# Patient Record
Sex: FEMALE | Race: WHITE
[De-identification: names, ages, dates, MRNs, and addresses within clinical notes are randomized per-mention and may not be internally consistent; named-entity substitution may affect disease eponyms.]

---

## 2023-11-16 ENCOUNTER — OFFICE VISIT (OUTPATIENT)
Dept: INTERNAL MEDICINE CLINIC | Facility: CLINIC | Age: 28
End: 2023-11-16

## 2023-11-16 VITALS
HEIGHT: 65 IN | WEIGHT: 255 LBS | TEMPERATURE: 98.8 F | HEART RATE: 93 BPM | DIASTOLIC BLOOD PRESSURE: 76 MMHG | SYSTOLIC BLOOD PRESSURE: 112 MMHG | OXYGEN SATURATION: 96 % | BODY MASS INDEX: 42.49 KG/M2

## 2023-11-16 DIAGNOSIS — Z91.030 BEE ALLERGY STATUS: ICD-10-CM

## 2023-11-16 DIAGNOSIS — F32.A ANXIETY AND DEPRESSION: ICD-10-CM

## 2023-11-16 DIAGNOSIS — N92.6 MENSTRUAL CYCLE PROBLEM: ICD-10-CM

## 2023-11-16 DIAGNOSIS — R73.01 IFG (IMPAIRED FASTING GLUCOSE): ICD-10-CM

## 2023-11-16 DIAGNOSIS — J45.30 MILD PERSISTENT ASTHMA, UNCOMPLICATED: ICD-10-CM

## 2023-11-16 DIAGNOSIS — Z00.00 ROUTINE ADULT HEALTH MAINTENANCE: Primary | ICD-10-CM

## 2023-11-16 DIAGNOSIS — F41.9 ANXIETY AND DEPRESSION: ICD-10-CM

## 2023-11-16 PROBLEM — G93.2 IIH (IDIOPATHIC INTRACRANIAL HYPERTENSION): Status: ACTIVE | Noted: 2018-04-15

## 2023-11-16 PROBLEM — K76.0 NON-ALCOHOLIC FATTY LIVER DISEASE: Status: ACTIVE | Noted: 2021-09-10

## 2023-11-16 LAB
HCG, PREGNANCY, URINE, POC: NEGATIVE
VALID INTERNAL CONTROL, POC: YES

## 2023-11-16 RX ORDER — EPINEPHRINE 0.3 MG/.3ML
0.3 INJECTION SUBCUTANEOUS
COMMUNITY
Start: 2017-10-20

## 2023-11-16 RX ORDER — ALBUTEROL SULFATE 90 UG/1
2 AEROSOL, METERED RESPIRATORY (INHALATION) EVERY 6 HOURS PRN
Qty: 18 G | Refills: 11 | Status: SHIPPED | OUTPATIENT
Start: 2023-11-16

## 2023-11-16 RX ORDER — MELOXICAM 15 MG/1
15 TABLET ORAL DAILY
COMMUNITY
Start: 2023-08-29

## 2023-11-16 RX ORDER — TOPIRAMATE 50 MG/1
50 TABLET, FILM COATED ORAL
COMMUNITY
Start: 2023-05-30

## 2023-11-16 RX ORDER — ALBUTEROL SULFATE 90 UG/1
AEROSOL, METERED RESPIRATORY (INHALATION)
COMMUNITY
Start: 2023-10-16 | End: 2023-11-16 | Stop reason: SDUPTHER

## 2023-11-16 RX ORDER — CETIRIZINE HYDROCHLORIDE 10 MG/1
10 TABLET ORAL DAILY
COMMUNITY
Start: 2019-08-17

## 2023-11-16 RX ORDER — PANTOPRAZOLE SODIUM 40 MG/1
40 TABLET, DELAYED RELEASE ORAL DAILY
Qty: 30 TABLET | Refills: 11 | COMMUNITY
Start: 2023-05-30 | End: 2024-05-29

## 2023-11-16 RX ORDER — FLUTICASONE PROPIONATE AND SALMETEROL 250; 50 UG/1; UG/1
1 POWDER RESPIRATORY (INHALATION) EVERY 12 HOURS
Qty: 60 EACH | Refills: 11 | Status: SHIPPED | OUTPATIENT
Start: 2023-11-16

## 2023-11-16 RX ORDER — IPRATROPIUM BROMIDE AND ALBUTEROL SULFATE 2.5; .5 MG/3ML; MG/3ML
3 SOLUTION RESPIRATORY (INHALATION) EVERY 6 HOURS PRN
COMMUNITY
Start: 2021-08-23

## 2023-11-16 RX ORDER — EPINEPHRINE 0.3 MG/.3ML
0.3 INJECTION SUBCUTANEOUS DAILY PRN
Qty: 0.3 ML | Refills: 5 | Status: SHIPPED | OUTPATIENT
Start: 2023-11-16

## 2023-11-16 RX ORDER — FLUTICASONE PROPIONATE AND SALMETEROL 250; 50 UG/1; UG/1
1 POWDER RESPIRATORY (INHALATION) EVERY 12 HOURS
COMMUNITY
End: 2023-11-16 | Stop reason: SDUPTHER

## 2023-11-16 SDOH — ECONOMIC STABILITY: FOOD INSECURITY: WITHIN THE PAST 12 MONTHS, THE FOOD YOU BOUGHT JUST DIDN'T LAST AND YOU DIDN'T HAVE MONEY TO GET MORE.: NEVER TRUE

## 2023-11-16 SDOH — ECONOMIC STABILITY: HOUSING INSECURITY
IN THE LAST 12 MONTHS, WAS THERE A TIME WHEN YOU DID NOT HAVE A STEADY PLACE TO SLEEP OR SLEPT IN A SHELTER (INCLUDING NOW)?: NO

## 2023-11-16 SDOH — ECONOMIC STABILITY: INCOME INSECURITY: HOW HARD IS IT FOR YOU TO PAY FOR THE VERY BASICS LIKE FOOD, HOUSING, MEDICAL CARE, AND HEATING?: NOT HARD AT ALL

## 2023-11-16 SDOH — ECONOMIC STABILITY: FOOD INSECURITY: WITHIN THE PAST 12 MONTHS, YOU WORRIED THAT YOUR FOOD WOULD RUN OUT BEFORE YOU GOT MONEY TO BUY MORE.: NEVER TRUE

## 2023-11-16 ASSESSMENT — PATIENT HEALTH QUESTIONNAIRE - PHQ9
5. POOR APPETITE OR OVEREATING: 3
7. TROUBLE CONCENTRATING ON THINGS, SUCH AS READING THE NEWSPAPER OR WATCHING TELEVISION: 1
6. FEELING BAD ABOUT YOURSELF - OR THAT YOU ARE A FAILURE OR HAVE LET YOURSELF OR YOUR FAMILY DOWN: 0
9. THOUGHTS THAT YOU WOULD BE BETTER OFF DEAD, OR OF HURTING YOURSELF: 0
SUM OF ALL RESPONSES TO PHQ QUESTIONS 1-9: 11
1. LITTLE INTEREST OR PLEASURE IN DOING THINGS: 0
10. IF YOU CHECKED OFF ANY PROBLEMS, HOW DIFFICULT HAVE THESE PROBLEMS MADE IT FOR YOU TO DO YOUR WORK, TAKE CARE OF THINGS AT HOME, OR GET ALONG WITH OTHER PEOPLE: 2
3. TROUBLE FALLING OR STAYING ASLEEP: 3
4. FEELING TIRED OR HAVING LITTLE ENERGY: 1
SUM OF ALL RESPONSES TO PHQ QUESTIONS 1-9: 11
2. FEELING DOWN, DEPRESSED OR HOPELESS: 0
8. MOVING OR SPEAKING SO SLOWLY THAT OTHER PEOPLE COULD HAVE NOTICED. OR THE OPPOSITE, BEING SO FIGETY OR RESTLESS THAT YOU HAVE BEEN MOVING AROUND A LOT MORE THAN USUAL: 3
SUM OF ALL RESPONSES TO PHQ9 QUESTIONS 1 & 2: 0

## 2023-11-16 ASSESSMENT — COLUMBIA-SUICIDE SEVERITY RATING SCALE - C-SSRS
3. HAVE YOU BEEN THINKING ABOUT HOW YOU MIGHT KILL YOURSELF?: NO
7. DID THIS OCCUR IN THE LAST THREE MONTHS: NO
5. HAVE YOU STARTED TO WORK OUT OR WORKED OUT THE DETAILS OF HOW TO KILL YOURSELF? DO YOU INTEND TO CARRY OUT THIS PLAN?: NO
4. HAVE YOU HAD THESE THOUGHTS AND HAD SOME INTENTION OF ACTING ON THEM?: NO

## 2023-11-16 ASSESSMENT — ENCOUNTER SYMPTOMS
NAUSEA: 1
CHEST TIGHTNESS: 0

## 2023-11-16 NOTE — PROGRESS NOTES
Conjunctiva/sclera: Conjunctivae normal.   Cardiovascular:      Rate and Rhythm: Normal rate and regular rhythm. Heart sounds: Normal heart sounds. Pulmonary:      Effort: Pulmonary effort is normal.      Breath sounds: Normal breath sounds. Abdominal:      General: Bowel sounds are normal.      Palpations: Abdomen is soft. Musculoskeletal:      Right lower leg: No edema. Left lower leg: No edema. Skin:     General: Skin is warm. Coloration: Skin is not jaundiced. Neurological:      General: No focal deficit present. Mental Status: She is alert. Mental status is at baseline. Psychiatric:         Mood and Affect: Mood normal.         Thought Content: Thought content normal.            Wen was seen today for new patient and medication refill. Diagnoses and all orders for this visit:    Routine adult health maintenance  -     [facility]; Future  -     Comprehensive Metabolic Panel; Future  -     Lipid Panel; Future  -     Hemoglobin A1C; Future  -     TSH; Future    Mild persistent asthma, uncomplicated  -     albuterol sulfate HFA (PROVENTIL;VENTOLIN;PROAIR) 108 (90 Base) MCG/ACT inhaler; Inhale 2 puffs into the lungs every 6 hours as needed for Wheezing    Menstrual cycle problem  Comments:  advised to get birth control ,refer gyn  Orders:  -     AMB POC URINE PREGNANCY TEST, VISUAL COLOR COMPARISON    Bee allergy status  -     [facility]    Anxiety and depression  -     CBC; Future  -     Comprehensive Metabolic Panel; Future  -     Lipid Panel; Future  -     Hemoglobin A1C; Future  -     TSH; Future    IFG (impaired fasting glucose)  -     CBC; Future  -     Comprehensive Metabolic Panel; Future  -     Lipid Panel; Future  -     Hemoglobin A1C; Future  -     TSH; Future    Other orders  -     sertraline (ZOLOFT) 50 MG tablet;  Take 1 tablet by mouth daily  -     fluticasone-salmeterol (ADVAIR) 250-50 MCG/ACT AEPB

## 2024-02-09 ENCOUNTER — NURSE ONLY (OUTPATIENT)
Dept: INTERNAL MEDICINE CLINIC | Facility: CLINIC | Age: 29
End: 2024-02-09

## 2024-02-09 DIAGNOSIS — Z00.00 ROUTINE ADULT HEALTH MAINTENANCE: ICD-10-CM

## 2024-02-09 DIAGNOSIS — F41.9 ANXIETY AND DEPRESSION: ICD-10-CM

## 2024-02-09 DIAGNOSIS — R73.01 IFG (IMPAIRED FASTING GLUCOSE): ICD-10-CM

## 2024-02-09 DIAGNOSIS — F32.A ANXIETY AND DEPRESSION: ICD-10-CM

## 2024-02-09 LAB
ERYTHROCYTE [DISTWIDTH] IN BLOOD BY AUTOMATED COUNT: 12.5 % (ref 11.9–14.6)
EST. AVERAGE GLUCOSE BLD GHB EST-MCNC: 131 MG/DL
HBA1C MFR BLD: 6.2 % (ref 4.8–5.6)
HCT VFR BLD AUTO: 42.7 % (ref 35.8–46.3)
HGB BLD-MCNC: 13.4 G/DL (ref 11.7–15.4)
MCH RBC QN AUTO: 27.8 PG (ref 26.1–32.9)
MCHC RBC AUTO-ENTMCNC: 31.4 G/DL (ref 31.4–35)
MCV RBC AUTO: 88.6 FL (ref 82–102)
NRBC # BLD: 0 K/UL (ref 0–0.2)
PLATELET # BLD AUTO: 359 K/UL (ref 150–450)
PMV BLD AUTO: 9.6 FL (ref 9.4–12.3)
RBC # BLD AUTO: 4.82 M/UL (ref 4.05–5.2)
WBC # BLD AUTO: 10.5 K/UL (ref 4.3–11.1)

## 2024-02-10 LAB
ALBUMIN SERPL-MCNC: 3.8 G/DL (ref 3.5–5)
ALBUMIN/GLOB SERPL: 1.1 (ref 0.4–1.6)
ALP SERPL-CCNC: 96 U/L (ref 50–136)
ALT SERPL-CCNC: 45 U/L (ref 12–65)
ANION GAP SERPL CALC-SCNC: 5 MMOL/L (ref 2–11)
AST SERPL-CCNC: 23 U/L (ref 15–37)
BILIRUB SERPL-MCNC: 0.4 MG/DL (ref 0.2–1.1)
BUN SERPL-MCNC: 9 MG/DL (ref 6–23)
CALCIUM SERPL-MCNC: 8.9 MG/DL (ref 8.3–10.4)
CHLORIDE SERPL-SCNC: 104 MMOL/L (ref 103–113)
CHOLEST SERPL-MCNC: 205 MG/DL
CO2 SERPL-SCNC: 31 MMOL/L (ref 21–32)
CREAT SERPL-MCNC: 0.8 MG/DL (ref 0.6–1)
GLOBULIN SER CALC-MCNC: 3.5 G/DL (ref 2.8–4.5)
GLUCOSE SERPL-MCNC: 157 MG/DL (ref 65–100)
HDLC SERPL-MCNC: 51 MG/DL (ref 40–60)
HDLC SERPL: 4
LDLC SERPL CALC-MCNC: 138.2 MG/DL
POTASSIUM SERPL-SCNC: 3.8 MMOL/L (ref 3.5–5.1)
PROT SERPL-MCNC: 7.3 G/DL (ref 6.3–8.2)
SODIUM SERPL-SCNC: 140 MMOL/L (ref 136–146)
TRIGL SERPL-MCNC: 79 MG/DL (ref 35–150)
TSH, 3RD GENERATION: 3.06 UIU/ML (ref 0.36–3.74)
VLDLC SERPL CALC-MCNC: 15.8 MG/DL (ref 6–23)

## 2024-02-16 ENCOUNTER — OFFICE VISIT (OUTPATIENT)
Dept: INTERNAL MEDICINE CLINIC | Facility: CLINIC | Age: 29
End: 2024-02-16

## 2024-02-16 VITALS
HEART RATE: 82 BPM | WEIGHT: 270 LBS | TEMPERATURE: 98.4 F | OXYGEN SATURATION: 97 % | SYSTOLIC BLOOD PRESSURE: 110 MMHG | DIASTOLIC BLOOD PRESSURE: 80 MMHG | BODY MASS INDEX: 44.98 KG/M2 | HEIGHT: 65 IN

## 2024-02-16 DIAGNOSIS — R73.01 IFG (IMPAIRED FASTING GLUCOSE): ICD-10-CM

## 2024-02-16 DIAGNOSIS — K21.9 GASTROESOPHAGEAL REFLUX DISEASE WITHOUT ESOPHAGITIS: ICD-10-CM

## 2024-02-16 DIAGNOSIS — J45.30 MILD PERSISTENT ASTHMA, UNCOMPLICATED: ICD-10-CM

## 2024-02-16 DIAGNOSIS — F41.9 ANXIETY AND DEPRESSION: Primary | ICD-10-CM

## 2024-02-16 DIAGNOSIS — K76.0 NON-ALCOHOLIC FATTY LIVER DISEASE: ICD-10-CM

## 2024-02-16 DIAGNOSIS — F32.A ANXIETY AND DEPRESSION: Primary | ICD-10-CM

## 2024-02-16 RX ORDER — CETIRIZINE HYDROCHLORIDE 10 MG/1
10 TABLET ORAL DAILY
Qty: 30 TABLET | Refills: 11 | Status: SHIPPED | OUTPATIENT
Start: 2024-02-16

## 2024-02-16 RX ORDER — PANTOPRAZOLE SODIUM 40 MG/1
40 TABLET, DELAYED RELEASE ORAL DAILY
Qty: 30 TABLET | Refills: 11 | Status: SHIPPED | OUTPATIENT
Start: 2024-02-16 | End: 2025-02-15

## 2024-02-16 ASSESSMENT — PATIENT HEALTH QUESTIONNAIRE - PHQ9
SUM OF ALL RESPONSES TO PHQ QUESTIONS 1-9: 0
SUM OF ALL RESPONSES TO PHQ9 QUESTIONS 1 & 2: 0
SUM OF ALL RESPONSES TO PHQ QUESTIONS 1-9: 0
1. LITTLE INTEREST OR PLEASURE IN DOING THINGS: 0
SUM OF ALL RESPONSES TO PHQ QUESTIONS 1-9: 0
SUM OF ALL RESPONSES TO PHQ QUESTIONS 1-9: 0
2. FEELING DOWN, DEPRESSED OR HOPELESS: 0

## 2024-02-16 NOTE — PROGRESS NOTES
Wen was seen today for follow-up.    Diagnoses and all orders for this visit:    Anxiety and depression  -     sertraline (ZOLOFT) 50 MG tablet; Take 1 tablet by mouth in the morning and at bedtime  -     Hemoglobin A1C; Future  -     CBC with Auto Differential; Future  -     Hepatic Function Panel; Future  -     Basic Metabolic Panel; Future    IFG (impaired fasting glucose)  -     Hemoglobin A1C; Future  -     CBC with Auto Differential; Future  -     Hepatic Function Panel; Future  -     Basic Metabolic Panel; Future    Mild persistent asthma, uncomplicated  -     cetirizine (ZYRTEC) 10 MG tablet; Take 1 tablet by mouth daily    Gastroesophageal reflux disease without esophagitis  -     pantoprazole (PROTONIX) 40 MG tablet; Take 1 tablet by mouth daily  -     Hemoglobin A1C; Future  -     CBC with Auto Differential; Future  -     Hepatic Function Panel; Future  -     Basic Metabolic Panel; Future    Non-alcoholic fatty liver disease  -     Hemoglobin A1C; Future  -     CBC with Auto Differential; Future  -     Hepatic Function Panel; Future  -     Basic Metabolic Panel; Future          Wen Soliman is a 28 y.o. female    Chief Complaint   Patient presents with    Follow-up     3 months      Sertraline helping somewhat   Feels like sertraline not as affective,   Stress with kids   Sleep not the best    Trying to exercise some      Nurse Only on 02/09/2024   Component Date Value Ref Range Status    TSH, 3RD GENERATION 02/09/2024 3.060  0.358 - 3.740 uIU/mL Final    Hemoglobin A1C 02/09/2024 6.2 (H)  4.8 - 5.6 % Final    Estimated Avg Glucose 02/09/2024 131  mg/dL Final    Comment: Reference Range  Normal: 4.8-5.6  Diabetic >=6.5%  Normal       <5.7%      Cholesterol, Total 02/09/2024 205 (H)  <200 MG/DL Final    Comment: Borderline High: 200-239 mg/dL  High: Greater than or equal to 240 mg/dL      Triglycerides 02/09/2024 79  35 - 150 MG/DL Final    Comment: Borderline High: 150-199 mg/dL, High: 200-499

## 2024-05-17 ENCOUNTER — TELEPHONE (OUTPATIENT)
Dept: INTERNAL MEDICINE CLINIC | Facility: CLINIC | Age: 29
End: 2024-05-17

## 2024-05-17 NOTE — TELEPHONE ENCOUNTER
Called patient about missed appointment on 5/17/24 and patient states that she forgot about her appointment and she rescheduled.

## 2024-05-29 ENCOUNTER — TELEPHONE (OUTPATIENT)
Dept: INTERNAL MEDICINE CLINIC | Facility: CLINIC | Age: 29
End: 2024-05-29

## 2024-07-30 ENCOUNTER — APPOINTMENT (OUTPATIENT)
Dept: GENERAL RADIOLOGY | Age: 29
End: 2024-07-30

## 2024-07-30 ENCOUNTER — HOSPITAL ENCOUNTER (EMERGENCY)
Age: 29
Discharge: HOME | End: 2024-07-30

## 2024-07-30 VITALS
RESPIRATION RATE: 17 BRPM | WEIGHT: 280 LBS | TEMPERATURE: 99 F | SYSTOLIC BLOOD PRESSURE: 141 MMHG | HEART RATE: 87 BPM | BODY MASS INDEX: 45 KG/M2 | HEIGHT: 66 IN | OXYGEN SATURATION: 95 % | DIASTOLIC BLOOD PRESSURE: 99 MMHG

## 2024-07-30 DIAGNOSIS — M25.561 ACUTE PAIN OF RIGHT KNEE: Primary | ICD-10-CM

## 2024-07-30 PROCEDURE — 73562 X-RAY EXAM OF KNEE 3: CPT

## 2024-07-30 PROCEDURE — 99283 EMERGENCY DEPT VISIT LOW MDM: CPT

## 2024-07-30 ASSESSMENT — PAIN DESCRIPTION - ORIENTATION
ORIENTATION: RIGHT
ORIENTATION: RIGHT

## 2024-07-30 ASSESSMENT — PAIN SCALES - GENERAL
PAINLEVEL_OUTOF10: 8
PAINLEVEL_OUTOF10: 6

## 2024-07-30 ASSESSMENT — PAIN DESCRIPTION - LOCATION
LOCATION: KNEE
LOCATION: KNEE

## 2024-07-30 ASSESSMENT — PAIN - FUNCTIONAL ASSESSMENT
PAIN_FUNCTIONAL_ASSESSMENT: 0-10
PAIN_FUNCTIONAL_ASSESSMENT: 0-10

## 2024-07-30 ASSESSMENT — LIFESTYLE VARIABLES
HOW OFTEN DO YOU HAVE A DRINK CONTAINING ALCOHOL: NEVER
HOW MANY STANDARD DRINKS CONTAINING ALCOHOL DO YOU HAVE ON A TYPICAL DAY: PATIENT DOES NOT DRINK

## 2024-07-30 NOTE — ED PROVIDER NOTES
Emergency Department Provider Note       PCP: Unknown, Provider, APRN - NP   Age: 29 y.o.   Sex: female     DISPOSITION Decision To Discharge 07/30/2024 04:07:17 AM       ICD-10-CM    1. Acute pain of right knee  M25.561 [facility]     [facility] ORTHOPEDIC SUPPLIES Crutches; Pair, Right Side Injury; Med (5'2\"-5'10\")          Medical Decision Making     29-year-old female presents with anterior right knee pain.  Imaging negative.  Discharged with follow-up to orthopedic.     1 or more acute illnesses that pose a threat to life or bodily function.   Over the counter drug management performed.  Prescription drug management performed.  Patient was discharged risks and benefits of hospitalization were considered.  Shared medical decision making was utilized in creating the patients health plan today.    I independently ordered and reviewed each unique test.  I reviewed external records: ED visit note from an outside group.  I reviewed external records: provider visit note from PCP.  I reviewed external records: provider visit note from outside specialist.     I interpreted the X-rays no fracture.              History     29-year-old female presents with right knee pain which has been ongoing for several days after falling in her yard.  She is able to bear weight with minimal pain.    The history is provided by the patient.       ROS     Review of Systems   Musculoskeletal:  Positive for arthralgias.   All other systems reviewed and are negative.       Physical Exam     Vitals signs and nursing note reviewed:  Vitals:    07/30/24 0222 07/30/24 0224 07/30/24 0420   BP: (!) 139/95  (!) 141/99   Pulse: (!) 107  87   Resp: 18  17   Temp: 99 °F (37.2 °C)     TempSrc: Oral     SpO2: 94%  95%   Weight:  127 kg (280 lb)    Height:  1.676 m (5' 6\")       Physical Exam  Vitals and nursing note reviewed.   Constitutional:       Appearance: Normal appearance.   Cardiovascular:      Rate and Rhythm:

## 2024-07-30 NOTE — ED NOTES
Patient mobility status  with no difficulty and crutches . Provider aware     I have reviewed discharge instructions with the patient.  The patient verbalized understanding.    Patient left ED via Discharge Method: crutches to Home with Extended Family:.    Opportunity for questions and clarification provided.     Patient given 0 scripts.            Allison Foster LPN  07/30/24 0422

## 2024-07-30 NOTE — ED TRIAGE NOTES
Patient arrives ambulatory to triage. Reports right knee pain x3 days. Reports tripping and falling on Saturday. States her knee \"locked up\". Denies hitting head, denies LOC, denies thinners.

## 2024-07-30 NOTE — DISCHARGE INSTRUCTIONS
Your knee x-ray shows no fracture.  Please follow-up with orthopedics as listed on this document for further evaluation of your knee pain.

## 2024-09-27 ENCOUNTER — HOSPITAL ENCOUNTER (EMERGENCY)
Age: 29
Discharge: HOME | End: 2024-09-28
Attending: EMERGENCY MEDICINE
Payer: COMMERCIAL

## 2024-09-27 VITALS
HEIGHT: 66 IN | BODY MASS INDEX: 40.18 KG/M2 | OXYGEN SATURATION: 97 % | HEART RATE: 103 BPM | RESPIRATION RATE: 19 BRPM | WEIGHT: 250 LBS | TEMPERATURE: 99.8 F | SYSTOLIC BLOOD PRESSURE: 145 MMHG | DIASTOLIC BLOOD PRESSURE: 91 MMHG

## 2024-09-27 DIAGNOSIS — J02.9 SORE THROAT: Primary | ICD-10-CM

## 2024-09-27 DIAGNOSIS — R06.00 DYSPNEA AND RESPIRATORY ABNORMALITIES: ICD-10-CM

## 2024-09-27 DIAGNOSIS — R06.89 DYSPNEA AND RESPIRATORY ABNORMALITIES: ICD-10-CM

## 2024-09-27 LAB — STREP, MOLECULAR: NOT DETECTED

## 2024-09-27 PROCEDURE — 87651 STREP A DNA AMP PROBE: CPT

## 2024-09-27 PROCEDURE — 6370000000 HC RX 637 (ALT 250 FOR IP): Performed by: EMERGENCY MEDICINE

## 2024-09-27 PROCEDURE — 99284 EMERGENCY DEPT VISIT MOD MDM: CPT

## 2024-09-27 PROCEDURE — 96372 THER/PROPH/DIAG INJ SC/IM: CPT

## 2024-09-27 PROCEDURE — 6360000002 HC RX W HCPCS: Performed by: EMERGENCY MEDICINE

## 2024-09-27 RX ORDER — FAMOTIDINE 20 MG/1
40 TABLET, FILM COATED ORAL ONCE
Status: COMPLETED | OUTPATIENT
Start: 2024-09-27 | End: 2024-09-27

## 2024-09-27 RX ORDER — FAMOTIDINE 20 MG/1
20 TABLET, FILM COATED ORAL 2 TIMES DAILY
Qty: 20 TABLET | Refills: 0 | Status: SHIPPED | OUTPATIENT
Start: 2024-09-27

## 2024-09-27 RX ORDER — METHYLPREDNISOLONE 4 MG
TABLET, DOSE PACK ORAL
Qty: 1 KIT | Refills: 0 | Status: SHIPPED | OUTPATIENT
Start: 2024-09-27

## 2024-09-27 RX ORDER — DEXAMETHASONE SODIUM PHOSPHATE 10 MG/ML
10 INJECTION INTRAMUSCULAR; INTRAVENOUS
Status: COMPLETED | OUTPATIENT
Start: 2024-09-27 | End: 2024-09-27

## 2024-09-27 RX ORDER — DIPHENHYDRAMINE HCL 25 MG
50 CAPSULE ORAL
Status: COMPLETED | OUTPATIENT
Start: 2024-09-27 | End: 2024-09-27

## 2024-09-27 RX ORDER — IPRATROPIUM BROMIDE AND ALBUTEROL SULFATE 2.5; .5 MG/3ML; MG/3ML
1 SOLUTION RESPIRATORY (INHALATION)
Status: COMPLETED | OUTPATIENT
Start: 2024-09-27 | End: 2024-09-27

## 2024-09-27 RX ADMIN — DEXAMETHASONE SODIUM PHOSPHATE 10 MG: 10 INJECTION INTRAMUSCULAR; INTRAVENOUS at 18:58

## 2024-09-27 RX ADMIN — FAMOTIDINE 40 MG: 20 TABLET, FILM COATED ORAL at 18:58

## 2024-09-27 RX ADMIN — IPRATROPIUM BROMIDE AND ALBUTEROL SULFATE 1 DOSE: 2.5; .5 SOLUTION RESPIRATORY (INHALATION) at 20:25

## 2024-09-27 RX ADMIN — DIPHENHYDRAMINE HYDROCHLORIDE 50 MG: 25 CAPSULE ORAL at 18:57

## 2024-09-27 ASSESSMENT — PAIN DESCRIPTION - ORIENTATION: ORIENTATION: MID;INNER

## 2024-09-27 ASSESSMENT — ENCOUNTER SYMPTOMS
SHORTNESS OF BREATH: 1
ABDOMINAL PAIN: 0
TROUBLE SWALLOWING: 1

## 2024-09-27 ASSESSMENT — PAIN DESCRIPTION - PAIN TYPE: TYPE: ACUTE PAIN

## 2024-09-27 ASSESSMENT — PAIN DESCRIPTION - DIRECTION: RADIATING_TOWARDS: NECK

## 2024-09-27 ASSESSMENT — PAIN DESCRIPTION - LOCATION: LOCATION: THROAT

## 2024-09-27 ASSESSMENT — PAIN DESCRIPTION - FREQUENCY: FREQUENCY: CONTINUOUS

## 2024-09-27 ASSESSMENT — PAIN - FUNCTIONAL ASSESSMENT
PAIN_FUNCTIONAL_ASSESSMENT: ACTIVITIES ARE NOT PREVENTED
PAIN_FUNCTIONAL_ASSESSMENT: 0-10

## 2024-09-27 ASSESSMENT — PAIN DESCRIPTION - DESCRIPTORS: DESCRIPTORS: SORE

## 2024-09-27 ASSESSMENT — PAIN DESCRIPTION - ONSET: ONSET: ON-GOING

## 2024-09-27 ASSESSMENT — PAIN SCALES - GENERAL: PAINLEVEL_OUTOF10: 4

## 2024-09-27 NOTE — ED PROVIDER NOTES
Emergency Department Provider Note       PCP: Unknown, Provider   Age: 29 y.o.   Sex: female     DISPOSITION Decision To Discharge 09/27/2024 08:57:30 PM  Condition at Disposition: Data Unavailable       ICD-10-CM    1. Sore throat  J02.9       2. Dyspnea and respiratory abnormalities  R06.00     R06.89           Medical Decision Making     29-year-old female patient presents with a 1 day history of throat irritation and soreness and feeling short of breath  States it began when she was cleaning a bathroom with a combination of bleach and [place]  Attempted a breathing treatment last night but felt worse this morning  Workup today revealed a negative strep  Feeling better with breathing treatment  Will discharge     1 acute illness with systemic symptoms.  Prescription drug management performed.  Patient was discharged risks and benefits of hospitalization were considered.  Shared medical decision making was utilized in creating the patients health plan today.    I independently ordered and reviewed each unique test.  I reviewed external records: ED visit note from an outside group.  I reviewed external records: provider visit note from PCP.  I reviewed external records: provider visit note from outside specialist.   The patients assessment required an independent historian: Family members at bedside.  The reason they were needed is important historical information not provided by the patient.                History     29-year-old female patient presents to the ER with complaints of throat tightness pain when she swallows mild shortness of breath  Patient states she was cleaning with bleach in her bathroom and she noticed that her throat became to get tight and irritated and had some cough  She has had no vomiting or diarrhea  Attempted Benadryl and a nebulizer treatment overnight but her symptoms persisted until today  Arrives here with a temperature of 99 8 mildly tachycardic      The history is provided by

## 2024-09-28 NOTE — DISCHARGE INSTRUCTIONS
Take medication as prescribed  Use your breathing treatments 3-4 times a day as needed    Avoid exposure to possible toxic fumes    Follow-up with your doctor Monday  We would love to help you get a primary care doctor for follow-up after your emergency department visit.    Please call [phone] between 7AM - 6PM Monday to Friday.  A care navigator will be able to assist you with setting up a doctor close to your home.         Return to ER for any worsening symptoms or new problems which may arise

## 2024-10-05 ENCOUNTER — APPOINTMENT (OUTPATIENT)
Dept: GENERAL RADIOLOGY | Age: 29
End: 2024-10-05
Payer: COMMERCIAL

## 2024-10-05 ENCOUNTER — HOSPITAL ENCOUNTER (EMERGENCY)
Age: 29
Discharge: HOME | End: 2024-10-05
Attending: EMERGENCY MEDICINE
Payer: COMMERCIAL

## 2024-10-05 VITALS
HEIGHT: 66 IN | BODY MASS INDEX: 40.18 KG/M2 | HEART RATE: 88 BPM | WEIGHT: 250 LBS | DIASTOLIC BLOOD PRESSURE: 91 MMHG | RESPIRATION RATE: 17 BRPM | OXYGEN SATURATION: 98 % | TEMPERATURE: 98.6 F | SYSTOLIC BLOOD PRESSURE: 135 MMHG

## 2024-10-05 DIAGNOSIS — J02.9 SORE THROAT: Primary | ICD-10-CM

## 2024-10-05 DIAGNOSIS — R13.10 DYSPHAGIA, UNSPECIFIED TYPE: ICD-10-CM

## 2024-10-05 LAB
ANION GAP SERPL CALC-SCNC: 11 MMOL/L (ref 9–18)
BASOPHILS # BLD: 0.1 K/UL (ref 0–0.2)
BASOPHILS NFR BLD: 1 % (ref 0–2)
BUN SERPL-MCNC: 5 MG/DL (ref 6–23)
CALCIUM SERPL-MCNC: 9 MG/DL (ref 8.8–10.2)
CHLORIDE SERPL-SCNC: 104 MMOL/L (ref 98–107)
CO2 SERPL-SCNC: 25 MMOL/L (ref 20–28)
CREAT SERPL-MCNC: 0.8 MG/DL (ref 0.6–1.1)
DIFFERENTIAL METHOD BLD: ABNORMAL
EOSINOPHIL # BLD: 0.3 K/UL (ref 0–0.8)
EOSINOPHIL NFR BLD: 4 % (ref 0.5–7.8)
ERYTHROCYTE [DISTWIDTH] IN BLOOD BY AUTOMATED COUNT: 12.6 % (ref 11.9–14.6)
GLUCOSE SERPL-MCNC: 132 MG/DL (ref 70–99)
HCT VFR BLD AUTO: 40.9 % (ref 35.8–46.3)
HETEROPH AB BLD QL IA: NEGATIVE
HGB BLD-MCNC: 13.5 G/DL (ref 11.7–15.4)
IMM GRANULOCYTES # BLD AUTO: 0.1 K/UL (ref 0–0.5)
IMM GRANULOCYTES NFR BLD AUTO: 1 % (ref 0–5)
LYMPHOCYTES # BLD: 2.5 K/UL (ref 0.5–4.6)
LYMPHOCYTES NFR BLD: 26 % (ref 13–44)
MCH RBC QN AUTO: 28.2 PG (ref 26.1–32.9)
MCHC RBC AUTO-ENTMCNC: 33 G/DL (ref 31.4–35)
MCV RBC AUTO: 85.6 FL (ref 82–102)
MONOCYTES # BLD: 0.6 K/UL (ref 0.1–1.3)
MONOCYTES NFR BLD: 6 % (ref 4–12)
NEUTS SEG # BLD: 6.3 K/UL (ref 1.7–8.2)
NEUTS SEG NFR BLD: 64 % (ref 43–78)
NRBC # BLD: 0 K/UL (ref 0–0.2)
PLATELET # BLD AUTO: 318 K/UL (ref 150–450)
PMV BLD AUTO: 9.2 FL (ref 9.4–12.3)
POTASSIUM SERPL-SCNC: 4 MMOL/L (ref 3.5–5.1)
RBC # BLD AUTO: 4.78 M/UL (ref 4.05–5.2)
SODIUM SERPL-SCNC: 140 MMOL/L (ref 136–145)
STREP, MOLECULAR: NOT DETECTED
WBC # BLD AUTO: 9.8 K/UL (ref 4.3–11.1)

## 2024-10-05 PROCEDURE — 85025 COMPLETE CBC W/AUTO DIFF WBC: CPT

## 2024-10-05 PROCEDURE — 87651 STREP A DNA AMP PROBE: CPT

## 2024-10-05 PROCEDURE — 70360 X-RAY EXAM OF NECK: CPT

## 2024-10-05 PROCEDURE — 6370000000 HC RX 637 (ALT 250 FOR IP): Performed by: EMERGENCY MEDICINE

## 2024-10-05 PROCEDURE — 86308 HETEROPHILE ANTIBODY SCREEN: CPT

## 2024-10-05 PROCEDURE — 80048 BASIC METABOLIC PNL TOTAL CA: CPT

## 2024-10-05 PROCEDURE — 99284 EMERGENCY DEPT VISIT MOD MDM: CPT

## 2024-10-05 RX ORDER — SUCRALFATE 1 G/1
1 TABLET ORAL 4 TIMES DAILY
Qty: 120 TABLET | Refills: 0 | Status: SHIPPED | OUTPATIENT
Start: 2024-10-05

## 2024-10-05 RX ORDER — SUCRALFATE 1 G/1
1 TABLET ORAL
Status: COMPLETED | OUTPATIENT
Start: 2024-10-05 | End: 2024-10-05

## 2024-10-05 RX ADMIN — SUCRALFATE 1 G: 1 TABLET ORAL at 22:54

## 2024-10-05 ASSESSMENT — PAIN SCALES - GENERAL: PAINLEVEL_OUTOF10: 2

## 2024-10-05 ASSESSMENT — LIFESTYLE VARIABLES
HOW MANY STANDARD DRINKS CONTAINING ALCOHOL DO YOU HAVE ON A TYPICAL DAY: 1 OR 2
HOW OFTEN DO YOU HAVE A DRINK CONTAINING ALCOHOL: MONTHLY OR LESS

## 2024-10-05 NOTE — ED TRIAGE NOTES
Patient arrived with a sore throat and difficulty swallowing food. Patient reports of food getting stuck and chokes on it.     Patient reports of having issues when she was younger- took her tonsils out.

## 2024-10-05 NOTE — ED PROVIDER NOTES
Physical Activity     Days of Exercise per Week: 0     Minutes of Exercise per Session: 0     Total Minutes of Exercise per Week: 0   Stress: Stress Concern Present (5/30/2023)    Received from [facility]    Stress     Feeling of Stress : To some extent   Social Connections: Unknown (5/31/2024)    Received from [facility]    Social Connections     Frequency of Communication with Friends and Family: Not asked     Frequency of Social Gatherings with Friends and Family: Not asked   Intimate Partner Violence: Unknown (5/31/2024)    Received from [facility]    Intimate Partner Violence     Fear of Current or Ex-Partner: Not asked     Emotionally Abused: Not asked     Physically Abused: Not asked     Sexually Abused: Not asked   Housing Stability: Unknown (11/16/2023)    Housing Stability Vital Sign     Unstable Housing in the Last Year: No        Previous Medications    ALBUTEROL SULFATE HFA (PROVENTIL;VENTOLIN;PROAIR) 108 (90 BASE) MCG/ACT INHALER    Inhale 2 puffs into the lungs every 6 hours as needed for Wheezing    CETIRIZINE (ZYRTEC) 10 MG TABLET    Take 1 tablet by mouth daily    EPINEPHRINE (EPIPEN 2-FARIBA) 0.3 MG/0.3ML SOAJ INJECTION    Inject 0.3 mLs into the muscle daily as needed (anyphylaxis) Use as directed for allergic reaction    EPINEPHRINE (EPIPEN) 0.3 MG/0.3ML SOAJ INJECTION    Inject 0.3 mLs into the muscle once as needed    FAMOTIDINE (PEPCID) 20 MG TABLET    Take 1 tablet by mouth 2 times daily    FLUTICASONE-SALMETEROL (ADVAIR) 250-50 MCG/ACT AEPB DISKUS INHALER    Inhale 1 puff into the lungs in the morning and 1 puff in the evening.    IPRATROPIUM 0.5 MG-ALBUTEROL 2.5 MG (DUONEB) 0.5-2.5 (3) MG/3ML SOLN NEBULIZER SOLUTION    Inhale 3 mLs into the lungs every 6 hours as needed    MELOXICAM (MOBIC) 15 MG TABLET    Take 1 tablet by mouth daily    METHYLPREDNISOLONE (MEDROL DOSEPACK) 4 MG TABLET    Take by mouth as directed on pack    PANTOPRAZOLE  (PROTONIX) 40 MG TABLET    Take 1 tablet by mouth daily    SERTRALINE (ZOLOFT) 50 MG TABLET    Take 1 tablet by mouth in the morning and at bedtime    TOPIRAMATE (TOPAMAX) 50 MG TABLET    Take 1 tablet by mouth        Results for orders placed or performed during the hospital encounter of 10/05/24   Group A Strep Screen By PCR    Specimen: Swab   Result Value Ref Range    Strep, Molecular Not detected NOTD     XR NECK SOFT TISSUE    Narrative    CLINICAL INDICATION: Dysphagia.    TECHNIQUE: 2 views of the soft tissue neck were performed.      Impression    Findings and impression:    Cervical vertebral bodies are in good anatomic alignment. Vertebral body heights  are maintained. The spaces are intact. There is no prevertebral soft tissue  swelling.    Electronically signed by Arnoldo Muñiz   CBC with Auto Differential   Result Value Ref Range    WBC 9.8 4.3 - 11.1 K/uL    RBC 4.78 4.05 - 5.2 M/uL    Hemoglobin 13.5 11.7 - 15.4 g/dL    Hematocrit 40.9 35.8 - 46.3 %    MCV 85.6 82 - 102 FL    MCH 28.2 26.1 - 32.9 PG    MCHC 33.0 31.4 - 35.0 g/dL    RDW 12.6 11.9 - 14.6 %    Platelets 318 150 - 450 K/uL    MPV 9.2 (L) 9.4 - 12.3 FL    nRBC 0.00 0.0 - 0.2 K/uL    Differential Type AUTOMATED      Neutrophils % 64 43 - 78 %    Lymphocytes % 26 13 - 44 %    Monocytes % 6 4.0 - 12.0 %    Eosinophils % 4 0.5 - 7.8 %    Basophils % 1 0.0 - 2.0 %    Immature Granulocytes % 1 0.0 - 5.0 %    Neutrophils Absolute 6.3 1.7 - 8.2 K/UL    Lymphocytes Absolute 2.5 0.5 - 4.6 K/UL    Monocytes Absolute 0.6 0.1 - 1.3 K/UL    Eosinophils Absolute 0.3 0.0 - 0.8 K/UL    Basophils Absolute 0.1 0.0 - 0.2 K/UL    Immature Granulocytes Absolute 0.1 0.0 - 0.5 K/UL   Basic Metabolic Panel   Result Value Ref Range    Sodium 140 136 - 145 mmol/L    Potassium 4.0 3.5 - 5.1 mmol/L    Chloride 104 98 - 107 mmol/L    CO2 25 20 - 28 mmol/L    Anion Gap 11 9 - 18 mmol/L    Glucose 132 (H) 70 - 99 mg/dL    BUN 5 (L) 6 - 23 MG/DL    Creatinine 0.80

## 2024-10-06 NOTE — DISCHARGE INSTRUCTIONS
I suspect irritation from stomach acid or other previous chemical exposure.  I suspect things will improve on their own but I want you to continue your acid medication.  Also use the Carafate.  If not improving in 3 to 4 days, call your primary care doctor recheck.  Also number for ear nose and throat doctor is given as well.  Recheck for drooling, vomiting or shortness of breath.

## 2024-10-06 NOTE — ED NOTES
I have reviewed discharge instructions with the patient.  The patient verbalized understanding.    Patient left ED via Discharge Method: ambulatory to Home with family.    Opportunity for questions and clarification provided.       Patient given 1 scripts.         To continue your aftercare when you leave the hospital, you may receive an automated call from our care team to check in on how you are doing.  This is a free service and part of our promise to provide the best care and service to meet your aftercare needs.” If you have questions, or wish to unsubscribe from this service please call [phone].  Thank you for Choosing our [facility] Emergency Department.        Lizzy Bañuelos LPN  10/05/24 3228

## 2024-10-10 ENCOUNTER — OFFICE VISIT (OUTPATIENT)
Dept: ENT CLINIC | Age: 29
End: 2024-10-10
Payer: COMMERCIAL

## 2024-10-10 VITALS
RESPIRATION RATE: 19 BRPM | DIASTOLIC BLOOD PRESSURE: 80 MMHG | SYSTOLIC BLOOD PRESSURE: 128 MMHG | WEIGHT: 250 LBS | HEIGHT: 66 IN | BODY MASS INDEX: 40.18 KG/M2

## 2024-10-10 DIAGNOSIS — R13.12 OROPHARYNGEAL DYSPHAGIA: Primary | Chronic | ICD-10-CM

## 2024-10-10 DIAGNOSIS — K21.9 LARYNGOPHARYNGEAL REFLUX (LPR): Chronic | ICD-10-CM

## 2024-10-10 DIAGNOSIS — K11.7 XEROSTOMIA: Chronic | ICD-10-CM

## 2024-10-10 PROCEDURE — 99203 OFFICE O/P NEW LOW 30 MIN: CPT | Performed by: PHYSICIAN ASSISTANT

## 2024-10-10 PROCEDURE — 31575 DIAGNOSTIC LARYNGOSCOPY: CPT | Performed by: PHYSICIAN ASSISTANT

## 2024-10-10 ASSESSMENT — ENCOUNTER SYMPTOMS
SHORTNESS OF BREATH: 0
TROUBLE SWALLOWING: 1
CHOKING: 0
FACIAL SWELLING: 0
SINUS PRESSURE: 0
EYE DISCHARGE: 0
SINUS PAIN: 0
DIARRHEA: 0
STRIDOR: 0
COUGH: 0
VOICE CHANGE: 1
SORE THROAT: 1
APNEA: 0
CONSTIPATION: 0
NAUSEA: 0
EYE ITCHING: 0
EYE PAIN: 0
WHEEZING: 0

## 2024-10-10 NOTE — PROGRESS NOTES
noted.      Procedure: Diagnostic Flexible Laryngoscopy     Findings:   Nose: normal turbinates and middle meatus bilaterally, very dry. Mild rightward septal deviation.     Nasopharynx: normal eustachian tube, delia, and fossa of Rosenmueler bilaterally    Hypopharynx: normal including the vallecula, piriform sinuses, base of tongue, and postcricoid area. Erythema and edema of the arytenoid cartilage.     Glottis and Laryngeal Motor exam:  No evidence of vocal fold weakness. normal abduction, adduction, and stretch. no mucosal lesion.     Subglottis and Trachea:  no visible lesions    Indication: Adequate visualization of the larynx with connected speech not possible without endoscopic evaluation.    Consent: The patient verbally consented to the recording of their face and upper aerodigestive tract.    Anesthesia: Phenylephrine, Lidocaine    Details: A flexible fiberoptic laryngoscope was passed through the most patent nasal cavity into the nasopharynx which was examined. The scope was then passed into the oropharynx. The hypopharynx, base of tongue, vallecula, epiglottis, aryepiglottic folds, arytenoids, false vocal cords, true vocal cords, subglottic area, pyriform sinuses, and the post cricoid area was examined.      Assessment/Plan:  1. Oropharyngeal dysphagia  -     LARYNGOSCOPY,FLEX FIBER,DIAGNOSTIC  -     FL MODIFIED BARIUM SWALLOW W VIDEO; Future  2. Xerostomia  3. Laryngopharyngeal reflux (LPR)      Pt has findings consistent with LPR, recommend restarting her Pepcid and Protonix, she had stopped a year ago. Recommend MBS and likely GI referral. Oral hydrate for dryness. I will call with results.       Return if symptoms worsen or fail to improve.    Patient agrees with this plan.        BERTA Eng    This note was generated using voice recognition software, please excuse any typos.

## 2024-10-24 ENCOUNTER — HOSPITAL ENCOUNTER (OUTPATIENT)
Dept: GENERAL RADIOLOGY | Age: 29
Discharge: HOME | End: 2024-10-27
Payer: COMMERCIAL

## 2024-10-24 DIAGNOSIS — R13.12 OROPHARYNGEAL DYSPHAGIA: Chronic | ICD-10-CM

## 2024-10-24 DIAGNOSIS — R13.12 OROPHARYNGEAL DYSPHAGIA: Primary | ICD-10-CM

## 2024-10-24 DIAGNOSIS — K21.9 LARYNGOPHARYNGEAL REFLUX (LPR): ICD-10-CM

## 2024-10-24 PROCEDURE — 92611 MOTION FLUOROSCOPY/SWALLOW: CPT

## 2024-10-24 PROCEDURE — 2500000003 HC RX 250 WO HCPCS: Performed by: PHYSICIAN ASSISTANT

## 2024-10-24 PROCEDURE — 74230 X-RAY XM SWLNG FUNCJ C+: CPT

## 2024-10-24 RX ADMIN — BARIUM SULFATE 30 ML: 400 PASTE ORAL at 09:34

## 2024-10-24 RX ADMIN — BARIUM SULFATE 30 ML: 0.81 POWDER, FOR SUSPENSION ORAL at 09:34

## 2024-10-24 NOTE — THERAPY EVALUATION
Wen Soliman  : 1995  Primary: [place]  Secondary:   MRN: [id number] [facility] RADIOLOGY  [address] [place] [address]  Phone: [phone]    Visit Info:    Date 10/24/2024   SPEECH LANGUAGE PATHOLOGY:   MODIFIED BARIUM SWALLOW STUDY     Appt Desk   Episode      Treatment Diagnosis:    Oropharyngeal dysphagia    Medical/Referring Diagnosis:  Oropharyngeal dysphagia [R13.12]  Referring Physician:  Trupti Britton PA  Radiologist: Dr. Martin  Fluoroscopy completed by: Dr. Veronica HOLLAND Orders: Modified Barium Swallow  Date of Onset:  No data recorded   Allergies:  Latex, Bee venom, Ceftriaxone, Mushroom extract complex, and Penicillins    PAST MEDICAL HISTORY:   Ms. Soliman is a 29 y.o. female who  has a past medical history of Anxiety, Asthma, and Depression.  She also  has a past surgical history that includes  section.    ASSESSMENT/PLAN OF CARE   Based on the objective data described below, Ms. Soliman presents with oropharyngeal swallow within functional limits. Timely swallow of all consistencies with adequate hyolaryngeal elevation and excursion. No laryngeal penetration or aspiration observed and no pharyngeal residue after swallow.    RECOMMENDATIONS AND PLANNED INTERVENTIONS:  DIET:   regular  thin liquids    MEDICATIONS: as tolerated    Compensatory Swallowing Strategies/Modifications:  Upright for all PO  Alternate liquids/solids  Small bites and sips  Slow rate of PO intake  Remain upright for 20-30 min after any PO    Additional Recommendations/Referrals:  GI Consult  Further assessment of esophageal phase of swallow      GENERAL    Patient reports changes in vocal quality, globus sensation and painful swallow ever since cleaning with bleach in an enclosed room 1 month ago.  Present dysphagia symptoms: She currently complains of dysphagia, globus sensation with occasional regurgitation with breads, dysphonia, and odynophagia.   Current dietary status prior to  evaluation today: regular and thin liquids.  Previous dysphagia or speech therapy intervention (including previous MBS studies): none    OBJECTIVE    Modified barium swallow study was performed in the radiology suite using c-arm with Ms. Soliman in the lateral plane.    Oral Motor Assessment  Labial: no impairment  Lingual: no impairment  Dentition: natural  Oral Hygiene: clean and moist  Vocal quality: adequate  Volitional cough: present  Volitional swallow: present    PO trials  Patient was presented with trials of thin liquids (by spoon, cup sip, cup gulp, straw sip, and serial swallows), pudding, mixed consistency, and cracker.    Oral phase:  no significant oral issues observed    Pharyngeal phase:  Swallows of all textures were timely. No laryngeal penetration or aspiration was observed. No pharyngeal residue after swallow.  Pharyngeal characteristics:  functional pharyngeal swallow  adequate and timely retraction of base of tongue  adequate and timely hyolaryngeal elevation/excursion  adequate and timely epiglottic inversion  adequate and timely constriction of posterior pharyngeal wall  adequate laryngeal closure  Aspiration/Penetration Scale:   Thin liquids: 1 (No penetration/aspiration. Contrast does not enter the airway.)  Mildly thick liquids: Not assessed.  Moderately thick liquids: Not assessed.  Puddin (No penetration/aspiration. Contrast does not enter the airway.)  Mixed consistency: 1 (No penetration/aspiration. Contrast does not enter the airway.)  Cracker: 1 (No penetration/aspiration. Contrast does not enter the airway.)    Upper Esophageal Screen:   adequate and timely clearance of all boluses through cervical esophagus  unable to rule out an esophageal component of dysphagia  recommend further assessment of esophageal phase of swallow  *Distal esophagus not assessed due to limitations of modified barium swallow study.     National Outcomes Measure:   SWALLOWING: Level 7:  The individual's

## 2025-01-28 ENCOUNTER — APPOINTMENT (OUTPATIENT)
Dept: CT IMAGING | Age: 30
End: 2025-01-28
Payer: MEDICAID

## 2025-01-28 ENCOUNTER — HOSPITAL ENCOUNTER (EMERGENCY)
Age: 30
Discharge: HOME | End: 2025-01-28
Payer: MEDICAID

## 2025-01-28 VITALS
OXYGEN SATURATION: 95 % | HEIGHT: 66 IN | TEMPERATURE: 98.1 F | WEIGHT: 250 LBS | BODY MASS INDEX: 40.18 KG/M2 | SYSTOLIC BLOOD PRESSURE: 164 MMHG | HEART RATE: 80 BPM | RESPIRATION RATE: 16 BRPM | DIASTOLIC BLOOD PRESSURE: 108 MMHG

## 2025-01-28 DIAGNOSIS — R51.9 NONINTRACTABLE HEADACHE, UNSPECIFIED CHRONICITY PATTERN, UNSPECIFIED HEADACHE TYPE: Primary | ICD-10-CM

## 2025-01-28 PROCEDURE — 99284 EMERGENCY DEPT VISIT MOD MDM: CPT

## 2025-01-28 PROCEDURE — 70450 CT HEAD/BRAIN W/O DYE: CPT

## 2025-01-28 RX ORDER — BUTALBITAL, ACETAMINOPHEN AND CAFFEINE 50; 325; 40 MG/1; MG/1; MG/1
1 TABLET ORAL EVERY 6 HOURS PRN
Qty: 30 TABLET | Refills: 0 | Status: SHIPPED | OUTPATIENT
Start: 2025-01-28

## 2025-01-28 RX ORDER — KETOROLAC TROMETHAMINE 30 MG/ML
30 INJECTION, SOLUTION INTRAMUSCULAR; INTRAVENOUS ONCE
Status: DISCONTINUED | OUTPATIENT
Start: 2025-01-28 | End: 2025-01-28

## 2025-01-28 RX ORDER — METOCLOPRAMIDE HYDROCHLORIDE 5 MG/ML
10 INJECTION INTRAMUSCULAR; INTRAVENOUS
Status: DISCONTINUED | OUTPATIENT
Start: 2025-01-28 | End: 2025-01-28

## 2025-01-28 RX ORDER — DIPHENHYDRAMINE HYDROCHLORIDE 50 MG/ML
25 INJECTION INTRAMUSCULAR; INTRAVENOUS
Status: DISCONTINUED | OUTPATIENT
Start: 2025-01-28 | End: 2025-01-28

## 2025-01-28 ASSESSMENT — ENCOUNTER SYMPTOMS
GASTROINTESTINAL NEGATIVE: 1
RESPIRATORY NEGATIVE: 1

## 2025-01-28 ASSESSMENT — PAIN DESCRIPTION - LOCATION: LOCATION: HEAD

## 2025-01-28 ASSESSMENT — LIFESTYLE VARIABLES
HOW OFTEN DO YOU HAVE A DRINK CONTAINING ALCOHOL: MONTHLY OR LESS
HOW MANY STANDARD DRINKS CONTAINING ALCOHOL DO YOU HAVE ON A TYPICAL DAY: 1 OR 2

## 2025-01-28 ASSESSMENT — PAIN - FUNCTIONAL ASSESSMENT: PAIN_FUNCTIONAL_ASSESSMENT: 0-10

## 2025-01-28 NOTE — ED PROVIDER NOTES
Emergency Department Provider Note       PCP: Unknown, Provider, ANP   Age: 29 y.o.   Sex: female     DISPOSITION Decision To Discharge 01/28/2025 04:43:46 PM    ICD-10-CM    1. Nonintractable headache, unspecified chronicity pattern, unspecified headache type  R51.9           Medical Decision Making     29-year-old female history of headaches presents with headache for 2 days.  No vision or neurologic changes.  Benign exam.  Wanted imaging of her head because typically her headaches will go away.  CT of the head negative for acute abnormality.  Did not want to stay for treatment in the ED.  I have written a prescription for Fioricet.  She is to follow-up with her doctor and return if worsening     1 or more acute illnesses that pose a threat to life or bodily function.   Prescription drug management performed.  Patient was discharged risks and benefits of hospitalization were considered.  Shared medical decision making was utilized in creating the patients health plan today.  I independently ordered and reviewed each unique test.                         History     29-year-old female with history of headaches reports history of pseudotumor cerebri.  Presents with diffuse throbbing headache for 3 days. No sudden onset or thunderclap.  Son has the flu but she denies any flulike symptoms and does not want to be tested.  No vision or neurologic changes.  No nausea or vomiting or other acute complaints.  Reports compliance with acetazolamide.         ROS     Review of Systems   Constitutional: Negative.    HENT: Negative.     Respiratory: Negative.     Cardiovascular: Negative.    Gastrointestinal: Negative.    Genitourinary: Negative.    Neurological:  Positive for headaches.   All other systems reviewed and are negative.       Physical Exam     Vitals signs and nursing note reviewed:  Vitals:    01/28/25 1444   BP: (!) 164/108   Pulse: 80   Resp: 16   Temp: 98.1 °F (36.7 °C)   SpO2: 95%      Physical Exam  Vitals

## 2025-05-01 ENCOUNTER — HOSPITAL ENCOUNTER (EMERGENCY)
Age: 30
Discharge: HOME | End: 2025-05-01
Payer: COMMERCIAL

## 2025-05-01 VITALS
RESPIRATION RATE: 17 BRPM | BODY MASS INDEX: 45.8 KG/M2 | HEART RATE: 87 BPM | TEMPERATURE: 98.1 F | DIASTOLIC BLOOD PRESSURE: 107 MMHG | OXYGEN SATURATION: 98 % | HEIGHT: 66 IN | SYSTOLIC BLOOD PRESSURE: 146 MMHG | WEIGHT: 285 LBS

## 2025-05-01 DIAGNOSIS — L03.213 PRESEPTAL CELLULITIS OF RIGHT LOWER EYELID: Primary | ICD-10-CM

## 2025-05-01 PROCEDURE — 99283 EMERGENCY DEPT VISIT LOW MDM: CPT

## 2025-05-01 RX ORDER — CLINDAMYCIN HYDROCHLORIDE 300 MG/1
300 CAPSULE ORAL 3 TIMES DAILY
Qty: 21 CAPSULE | Refills: 0 | Status: SHIPPED | OUTPATIENT
Start: 2025-05-01 | End: 2025-05-01

## 2025-05-01 RX ORDER — CLINDAMYCIN HYDROCHLORIDE 300 MG/1
300 CAPSULE ORAL 3 TIMES DAILY
Qty: 21 CAPSULE | Refills: 0 | Status: SHIPPED | OUTPATIENT
Start: 2025-05-01 | End: 2025-05-08

## 2025-05-01 ASSESSMENT — PAIN - FUNCTIONAL ASSESSMENT: PAIN_FUNCTIONAL_ASSESSMENT: 0-10

## 2025-05-01 ASSESSMENT — PAIN SCALES - GENERAL: PAINLEVEL_OUTOF10: 6

## 2025-05-01 ASSESSMENT — LIFESTYLE VARIABLES
HOW MANY STANDARD DRINKS CONTAINING ALCOHOL DO YOU HAVE ON A TYPICAL DAY: PATIENT DOES NOT DRINK
HOW OFTEN DO YOU HAVE A DRINK CONTAINING ALCOHOL: NEVER

## 2025-05-01 ASSESSMENT — PAIN DESCRIPTION - LOCATION: LOCATION: EAR

## 2025-05-01 ASSESSMENT — PAIN DESCRIPTION - ORIENTATION: ORIENTATION: RIGHT

## 2025-05-01 NOTE — ED TRIAGE NOTES
Pt ambulatory to triage c/o sty and acute orbital  swelling. Pt reports vision intact with slight blurriness.

## 2025-05-01 NOTE — ED PROVIDER NOTES
Emergency Department Provider Note       PCP: Unknown, Provider   Age: 30 y.o.   Sex: female     DISPOSITION Decision To Discharge 05/01/2025 12:23:10 PM   DISPOSITION CONDITION Stable            ICD-10-CM    1. Preseptal cellulitis of right lower eyelid  L03.213           Medical Decision Making     Patient presents to the ER with complaint of right lower eyelid swelling and redness.  Patient states initially when she woke up this morning it was much more swollen than it is currently.  Patient states it is very tender and painful.  Patient denies any pain in the eye, no pain with movement, no drainage from the eye.  Patient's presentation is consistent with preseptal cellulitis of the lower lid.  Will prescribe clindamycin.  Discussed medication, follow-up or return to ER for worsening symptoms or other concerns.  Patient verbalized understanding and agrees with plan.     1 acute complicated illness or injury.  Prescription drug management performed.  Shared medical decision making was utilized in creating the patients health plan today.  I independently ordered and reviewed each unique test.    I reviewed external records: provider visit note from outside specialist.                     History     30-year-old female presents to the ER with complaint of right lower lid swelling and redness.  Patient states she woke up and it was more swollen than it is now.  States it is very tender and painful.  Patient denies any pain in the eye or drainage from the eye.     The history is provided by the patient.     Physical Exam     Vitals signs and nursing note reviewed:  Vitals:    05/01/25 1153   BP: (!) 146/107   Pulse: 87   Resp: 17   Temp: 98.1 °F (36.7 °C)   SpO2: 98%   Weight: 129.3 kg (285 lb)   Height: 1.676 m (5' 6\")      Physical Exam  Vitals and nursing note reviewed.   Constitutional:       General: She is not in acute distress.     Appearance: Normal appearance. She is obese. She is not ill-appearing, 
What Is The Reason For Today's Visit?: Full Body Skin Examination
What Is The Reason For Today's Visit? (Being Monitored For X): the risk of recurrence of previously treated lesion(s)

## 2025-05-01 NOTE — DISCHARGE INSTRUCTIONS
Drink at least 8-10 cups of water a day. Take Tylenol as needed for pain.    Take medication as prescribed.    Warm compress is used to your lower eyelid for 10 minutes several times a day.    Call, make an appointment and follow up with your doctor in 1-2 days for a recheck.  If you do not have a doctor, call the following number.    Call [phone] between 7 AM and 6 PM Monday to Friday.  A care navigator can help you set up an appointment with a primary care physician close to your home.      Return to ER for worsening symptoms, chest pain, shortness of breath or any other concerns.